# Patient Record
Sex: FEMALE | Race: OTHER
[De-identification: names, ages, dates, MRNs, and addresses within clinical notes are randomized per-mention and may not be internally consistent; named-entity substitution may affect disease eponyms.]

---

## 2020-01-14 ENCOUNTER — HOSPITAL ENCOUNTER (INPATIENT)
Dept: HOSPITAL 74 - JLDR | Age: 25
LOS: 3 days | Discharge: HOME | DRG: 560 | End: 2020-01-17
Attending: OBSTETRICS & GYNECOLOGY | Admitting: OBSTETRICS & GYNECOLOGY
Payer: COMMERCIAL

## 2020-01-14 VITALS — BODY MASS INDEX: 0.1 KG/M2

## 2020-01-14 DIAGNOSIS — Z3A.39: ICD-10-CM

## 2020-01-14 DIAGNOSIS — O99.213: ICD-10-CM

## 2020-01-14 LAB
AMPHET UR-MCNC: NEGATIVE NG/ML
ANION GAP SERPL CALC-SCNC: 7 MMOL/L (ref 8–16)
APTT BLD: 31.5 SECONDS (ref 25.2–36.5)
BARBITURATES UR-MCNC: NEGATIVE NG/ML
BASOPHILS # BLD: 0.4 % (ref 0–2)
BENZODIAZ UR SCN-MCNC: NEGATIVE NG/ML
BUN SERPL-MCNC: 10.3 MG/DL (ref 7–18)
CALCIUM SERPL-MCNC: 8.9 MG/DL (ref 8.5–10.1)
CHLORIDE SERPL-SCNC: 103 MMOL/L (ref 98–107)
CO2 SERPL-SCNC: 26 MMOL/L (ref 21–32)
COCAINE UR-MCNC: NEGATIVE NG/ML
CREAT SERPL-MCNC: 0.8 MG/DL (ref 0.55–1.3)
DEPRECATED RDW RBC AUTO: 14.7 % (ref 11.6–15.6)
EOSINOPHIL # BLD: 0.1 % (ref 0–4.5)
GLUCOSE SERPL-MCNC: 80 MG/DL (ref 74–106)
HCT VFR BLD CALC: 36.4 % (ref 32.4–45.2)
HGB BLD-MCNC: 12.1 GM/DL (ref 10.7–15.3)
INR BLD: 1.04 (ref 0.83–1.09)
LYMPHOCYTES # BLD: 6.5 % (ref 8–40)
MCH RBC QN AUTO: 28.7 PG (ref 25.7–33.7)
MCHC RBC AUTO-ENTMCNC: 33.2 G/DL (ref 32–36)
MCV RBC: 86.4 FL (ref 80–96)
METHADONE UR-MCNC: NEGATIVE NG/ML
MONOCYTES # BLD AUTO: 4 % (ref 3.8–10.2)
NEUTROPHILS # BLD: 89 % (ref 42.8–82.8)
OPIATES UR QL SCN: NEGATIVE NG/ML
PCP UR QL SCN: NEGATIVE NG/ML
PLATELET # BLD AUTO: 306 K/MM3 (ref 134–434)
PMV BLD: 9.3 FL (ref 7.5–11.1)
POTASSIUM SERPLBLD-SCNC: 4.1 MMOL/L (ref 3.5–5.1)
PT PNL PPP: 12.3 SEC (ref 9.7–13)
RBC # BLD AUTO: 4.21 M/MM3 (ref 3.6–5.2)
SODIUM SERPL-SCNC: 136 MMOL/L (ref 136–145)
WBC # BLD AUTO: 15.9 K/MM3 (ref 4–10)

## 2020-01-14 RX ADMIN — Medication SCH MLS/HR: at 17:00

## 2020-01-14 RX ADMIN — SODIUM CHLORIDE, SODIUM LACTATE, POTASSIUM CHLORIDE, CALCIUM CHLORIDE AND DEXTROSE MONOHYDRATE SCH MLS/HR: 5; 600; 310; 30; 20 INJECTION, SOLUTION INTRAVENOUS at 11:00

## 2020-01-14 RX ADMIN — AMPICILLIN SCH MLS/HR: 1 INJECTION, POWDER, FOR SOLUTION INTRAMUSCULAR; INTRAVENOUS at 23:00

## 2020-01-14 RX ADMIN — AMPICILLIN SCH MLS/HR: 1 INJECTION, POWDER, FOR SOLUTION INTRAMUSCULAR; INTRAVENOUS at 15:00

## 2020-01-14 RX ADMIN — AMPICILLIN SCH MLS/HR: 1 INJECTION, POWDER, FOR SOLUTION INTRAMUSCULAR; INTRAVENOUS at 18:15

## 2020-01-14 NOTE — HP
Past Medical History





- Primary Care Physician


PCP:: Pasha Little





- Admission


Chief Complaint: pregnancy 39 weeks , labor


History of Present Illness: 





25 yo f  edc by sono 20 39 weeks, prenatal care at [facility] , 

no chart available , cx 2 cm 80 vx -2 mi, fhr cat1 , irregular contraction


History Source: Patient


Limitations to Obtaining History: No Limitations





- Past Medical History


Pulmonary: Yes: Asthma (no recent attack)


...: 1


...EDC by Sono: 20





- Past Surgical History


Hx Myomectomy: No


Hx Transabdominal Cerclage: No





- Smoking History


Smoking history: Never smoked


Have you smoked in the past 12 months: No





- Alcohol/Substance Use


Hx Alcohol Use: No





- Social History


History of Recent Travel: No





Home Medications





- Allergies


Allergies/Adverse Reactions: 


 Allergies











Allergy/AdvReac Type Severity Reaction Status Date / Time


 


No Known Allergies Allergy   Verified 20 10:45














- Home Medications


Home Medications: 


Ambulatory Orders





Prenatal Tablet 1 tablet PO DAILY 20 











Review of Systems





- Review of Systems


Constitutional: reports: No Symptoms


Eyes: reports: No Symptoms


HENT: reports: No Symptoms


Neck: reports: No Symptoms


Cardiovascular: reports: No Symptoms


Respiratory: reports: No Symptoms


Gastrointestinal: reports: No Symptoms


Genitourinary: reports: No Symptoms


Breasts: reports: No Symptoms Reported


Musculoskeletal: reports: No Symptoms


Integumentary: reports: No Symptoms


Neurological: reports: No Symptoms


Endocrine: reports: No Symptoms


Hematology/Lymphatic: reports: No Symptoms


Psychiatric: reports: No Symptoms





Physical Exam - Maternity


Vital Signs: 


 Vital Signs











Temperature  98.5 F   20 10:34


 


Pulse Rate  89   20 11:00


 


Respiratory Rate  18   20 11:00


 


Blood Pressure  125/79   20 11:00


 


O2 Sat by Pulse Oximetry (%)      











Constitutional: Yes: Well Nourished, No Distress, Calm


Eyes: Yes: WNL, Conjunctiva Clear, EOM Intact


HENT: Yes: WNL, Atraumatic, Normocephalic


Neck: Yes: WNL, Supple, Trachea Midline


Cardiovascular: Yes: WNL, Regular Rate and Rhythm


Breast(s): Yes: WNL





- Abdominal Exam/OB


Fundal Height: 38


Number of Fetuses: Single


Fetal Presentation: Vertex


Contractions: Yes


Regularity: Irregular


Intensity: Moderate


Fetal Monitor Mode: External


Fetal Heart Rate Location: [facility]


Category: I


Accelerations: Non-Uniform


Decelerations: None





- Vaginal Exam/OB


Vaginal Bleediing: No


Speculum Exam: No


Dilatation (cm): 2 cm


Effacement (%): 80 


Amniotic Membrane Status: Intact


Fetal Presentation: Vertex/Position


Fetal Station: -2





- Physical Exam


Musculoskeletal: Yes: WNL


Extremities: Yes: WNL


Edema: LLE: Trace, RLE: Trace


Deep Tendon Reflex Grade: Normal +2


Psychiatric: Yes: WNL





- Labs


Lab Results: 


 CBC, BMP





 20 10:10 











Hemorrhage Risk Assessment





- Risk Factors


Medium Risk Factors: Yes: None


High Risk Factors: Yes: None


Risk Score: 1


Risk Level: Medium Risk





Problem List





- Problems


(1) Pregnancy with 39 completed weeks gestation


Code(s): Z3A.39 - 39 WEEKS GESTATION OF PREGNANCY   





(2) Labor established


Code(s): GBR3577 -    





(3) Pregnancy with prenatal care elsewhere


Code(s): Z34.90 - ENCNTR FOR SUPRVSN OF NORMAL PREGNANCY, UNSP, UNSP TRIMESTER 

  


Qualifiers: 


   Trimester: third trimester   Qualified Code(s): Z34.93 - Encounter for 

supervision of normal pregnancy, unspecified, third trimester   





Assessment/Plan





admit, 


FHM


pain management 


GBS unknown


plan in Amp. 


try to obtain prenatal record Patient here today for # 2/3 Intravesical BCG (Bacillius Calmette-Judith) 17mg/50ml treatment.    Denies symptoms of: denies urinary symptoms.     Medication verified, no changes    Patient prepped with betadine swabs and 2% Lidocaine jelly in usual fashion at this time. Patient tolerated very well. Bladder emptied at this time with approximately 50 mL of clear yellow urine.  BCG instilled into bladder without difficulty. Patient tolerated very well. Catheter removed at this time. All discharge instructions reviewed at this time with patient. Patient instructed at procedures end to go home and retain medication for no more than 2 hours and rotate lying down on each side, abdomen and back, as instructed. Patient verbalized understanding      Prepared and stored by inpatient pharmacy.

## 2020-01-15 PROCEDURE — 0UQMXZZ REPAIR VULVA, EXTERNAL APPROACH: ICD-10-PCS | Performed by: OBSTETRICS & GYNECOLOGY

## 2020-01-15 RX ADMIN — FERROUS SULFATE TAB EC 324 MG (65 MG FE EQUIVALENT) SCH MG: 324 (65 FE) TABLET DELAYED RESPONSE at 22:17

## 2020-01-15 RX ADMIN — ACETAMINOPHEN PRN MG: 325 TABLET ORAL at 09:55

## 2020-01-15 RX ADMIN — IBUPROFEN PRN MG: 600 TABLET, FILM COATED ORAL at 22:17

## 2020-01-15 RX ADMIN — SODIUM CHLORIDE, SODIUM LACTATE, POTASSIUM CHLORIDE, CALCIUM CHLORIDE AND DEXTROSE MONOHYDRATE SCH: 5; 600; 310; 30; 20 INJECTION, SOLUTION INTRAVENOUS at 20:16

## 2020-01-15 RX ADMIN — AMPICILLIN SCH: 1 INJECTION, POWDER, FOR SOLUTION INTRAMUSCULAR; INTRAVENOUS at 09:22

## 2020-01-15 RX ADMIN — Medication SCH TAB: at 09:55

## 2020-01-15 RX ADMIN — ACETAMINOPHEN PRN MG: 325 TABLET ORAL at 22:18

## 2020-01-15 RX ADMIN — FERROUS SULFATE TAB EC 324 MG (65 MG FE EQUIVALENT) SCH MG: 324 (65 FE) TABLET DELAYED RESPONSE at 09:55

## 2020-01-15 RX ADMIN — Medication SCH: at 20:16

## 2020-01-15 RX ADMIN — BUPIVACAINE HYDROCHLORIDE SCH ML: 7.5 INJECTION, SOLUTION EPIDURAL; RETROBULBAR at 00:25

## 2020-01-15 RX ADMIN — AMPICILLIN SCH MLS/HR: 1 INJECTION, POWDER, FOR SOLUTION INTRAMUSCULAR; INTRAVENOUS at 03:00

## 2020-01-15 NOTE — PN
Delivery





- Delivery


Vaginal Delivery: Spontaneous


Type of Anesthesia: Local, Epidural


Episiotomy/Laceration: Periurethral Extnsion/lac (cx full, head on pernium, 

head delivered , naso pharynx suctioned , ant .and post. shoulder with no 

difficulty , live baby boy apgar 9/9 . placenta complete . Rt periurethral 

laceration repaired with 2 two chromic interrupted suture , ebl 300 cc , no 

complication, baby bonded with mom)





Delivery, Single Birth





-  Feeding Plan


Initial Plan: Elected not to breastfeed exclusively throughout hospitalization

## 2020-01-16 LAB
BASOPHILS # BLD: 0.2 % (ref 0–2)
DEPRECATED RDW RBC AUTO: 14.8 % (ref 11.6–15.6)
EOSINOPHIL # BLD: 0.7 % (ref 0–4.5)
HCT VFR BLD CALC: 31.7 % (ref 32.4–45.2)
HGB BLD-MCNC: 10.5 GM/DL (ref 10.7–15.3)
LYMPHOCYTES # BLD: 11.9 % (ref 8–40)
MCH RBC QN AUTO: 29 PG (ref 25.7–33.7)
MCHC RBC AUTO-ENTMCNC: 33.2 G/DL (ref 32–36)
MCV RBC: 87.4 FL (ref 80–96)
MONOCYTES # BLD AUTO: 5.7 % (ref 3.8–10.2)
NEUTROPHILS # BLD: 81.5 % (ref 42.8–82.8)
PLATELET # BLD AUTO: 248 K/MM3 (ref 134–434)
PMV BLD: 8.9 FL (ref 7.5–11.1)
RBC # BLD AUTO: 3.63 M/MM3 (ref 3.6–5.2)
WBC # BLD AUTO: 11.8 K/MM3 (ref 4–10)

## 2020-01-16 RX ADMIN — FERROUS SULFATE TAB EC 324 MG (65 MG FE EQUIVALENT) SCH MG: 324 (65 FE) TABLET DELAYED RESPONSE at 10:35

## 2020-01-16 RX ADMIN — FERROUS SULFATE TAB EC 324 MG (65 MG FE EQUIVALENT) SCH MG: 324 (65 FE) TABLET DELAYED RESPONSE at 22:06

## 2020-01-16 RX ADMIN — Medication SCH TAB: at 10:35

## 2020-01-16 RX ADMIN — ACETAMINOPHEN PRN MG: 325 TABLET ORAL at 19:54

## 2020-01-16 RX ADMIN — BUPIVACAINE HYDROCHLORIDE SCH: 7.5 INJECTION, SOLUTION EPIDURAL; RETROBULBAR at 04:43

## 2020-01-17 VITALS — DIASTOLIC BLOOD PRESSURE: 61 MMHG | SYSTOLIC BLOOD PRESSURE: 124 MMHG | TEMPERATURE: 98.6 F | HEART RATE: 67 BPM

## 2020-01-17 RX ADMIN — Medication SCH TAB: at 09:28

## 2020-01-17 RX ADMIN — IBUPROFEN PRN MG: 600 TABLET, FILM COATED ORAL at 17:00

## 2020-01-17 RX ADMIN — FERROUS SULFATE TAB EC 324 MG (65 MG FE EQUIVALENT) SCH MG: 324 (65 FE) TABLET DELAYED RESPONSE at 09:28

## 2020-01-17 RX ADMIN — ACETAMINOPHEN PRN MG: 325 TABLET ORAL at 03:21

## 2020-01-17 RX ADMIN — IBUPROFEN PRN MG: 600 TABLET, FILM COATED ORAL at 08:08

## 2020-01-17 NOTE — DS
Physical Exam-GYN


Vital Signs: 


 Vital Signs











Temperature  98.6 F   20 10:00


 


Pulse Rate  67   20 10:00


 


Respiratory Rate  20   20 10:00


 


Blood Pressure  124/61   20 10:00


 


O2 Sat by Pulse Oximetry (%)  100   01/15/20 04:15











Constitutional: Yes: Well Nourished, Obese


Eyes: Yes: WNL


HENT: Yes: WNL


Neck: Yes: WNL


Respiratory: Yes: WNL


Gastrointestinal: Yes: WNL


....Post Partum: Yes: Uterus firm, Moderate lochia rubra (perineum healing, 

perineum)


Edema: LLE: 1+, RLE: 1+


Integumentary: Yes: Tattoos


Psychiatric: Yes: WNL, Alert, Oriented


Labs: 


 CBC, BMP





 20 07:30 





 20 10:10 











Delivery





- Delivery


Vaginal Delivery: Spontaneous


Type of Anesthesia: Local, Epidural


Episiotomy/Laceration: Periurethral Extnsion/lac


EBL (cc): 300





Delivery, Single Birth





- Stages of Labor


Date 1st Stage Initiatied: 20


Time 1st Stage Initiated: 09:45


Date 2nd Stage Initiated: 01/15/20


Time 2nd Stage Initiated: 03:30


Date of Delivery: 01/15/20


Time of Delivery: 04:27


Time Placenta Delivered: 04:30





- Condition of Infant


Pediatrician/Neonatologist Present: No


Infant Gender: Male


Birth Weight: 6 lb 5 oz


Position: Left, OA


Total Hours ROM (Hrs/Mins): 1H27M





- Apgar


  ** 1 Minute


Apgar Total Score: 9





  ** 5 Minutes


Apgar Total Score: 9





- [place] Feeding Plan


Initial Plan: Elected not to breastfeed exclusively throughout hospitalization





Remarks





- Remarks


Remarks: 





pp course uneventful 


discharge today 





Discharge Summary


Problems reviewed: Yes


Reason For Visit: ADMISSSION OF LABOR


Current Active Problems





Labor established (Acute)


Pregnancy with 39 completed weeks gestation (Acute)


Pregnancy with prenatal care elsewhere (Acute)








Condition: Stable





- Instructions


Diet, Activity, Other Instructions: 


return to clinic in 4-6 weeks for postpartum check.


call [facility] for appointment. [phone]


Referrals: 


Pasha Little MD [Staff Physician] - 


Disposition: HOME





- Home Medications


Comprehensive Discharge Medication List: 


Ambulatory Orders





Prenatal Tablet 1 tablet PO DAILY 20 








Prescription Drug Monitoring Program (I-STOP) results: I-STOP reviewed and no 

issues identified